# Patient Record
Sex: MALE | Race: WHITE | URBAN - METROPOLITAN AREA
[De-identification: names, ages, dates, MRNs, and addresses within clinical notes are randomized per-mention and may not be internally consistent; named-entity substitution may affect disease eponyms.]

---

## 2017-04-25 DIAGNOSIS — G35 MULTIPLE SCLEROSIS (H): ICD-10-CM

## 2017-12-07 ENCOUNTER — OFFICE VISIT (OUTPATIENT)
Dept: NEUROLOGY | Facility: CLINIC | Age: 62
End: 2017-12-07

## 2017-12-07 VITALS
DIASTOLIC BLOOD PRESSURE: 81 MMHG | WEIGHT: 257 LBS | HEIGHT: 72 IN | BODY MASS INDEX: 34.81 KG/M2 | HEART RATE: 65 BPM | SYSTOLIC BLOOD PRESSURE: 145 MMHG

## 2017-12-07 DIAGNOSIS — E55.9 VITAMIN D DEFICIENCY: Primary | ICD-10-CM

## 2017-12-07 DIAGNOSIS — E55.9 VITAMIN D DEFICIENCY: ICD-10-CM

## 2017-12-07 DIAGNOSIS — G35 MULTIPLE SCLEROSIS (H): ICD-10-CM

## 2017-12-07 LAB
ALBUMIN SERPL-MCNC: 3.8 G/DL (ref 3.4–5)
ALP SERPL-CCNC: 74 U/L (ref 40–150)
ALT SERPL W P-5'-P-CCNC: 36 U/L (ref 0–70)
ANION GAP SERPL CALCULATED.3IONS-SCNC: 6 MMOL/L (ref 3–14)
AST SERPL W P-5'-P-CCNC: 27 U/L (ref 0–45)
BASOPHILS # BLD AUTO: 0.1 10E9/L (ref 0–0.2)
BASOPHILS NFR BLD AUTO: 0.9 %
BILIRUB SERPL-MCNC: 0.5 MG/DL (ref 0.2–1.3)
BUN SERPL-MCNC: 20 MG/DL (ref 7–30)
CALCIUM SERPL-MCNC: 9.3 MG/DL (ref 8.5–10.1)
CHLORIDE SERPL-SCNC: 102 MMOL/L (ref 94–109)
CO2 SERPL-SCNC: 31 MMOL/L (ref 20–32)
CREAT SERPL-MCNC: 0.88 MG/DL (ref 0.66–1.25)
DEPRECATED CALCIDIOL+CALCIFEROL SERPL-MC: 59 UG/L (ref 20–75)
DIFFERENTIAL METHOD BLD: NORMAL
EOSINOPHIL # BLD AUTO: 0.1 10E9/L (ref 0–0.7)
EOSINOPHIL NFR BLD AUTO: 1.6 %
ERYTHROCYTE [DISTWIDTH] IN BLOOD BY AUTOMATED COUNT: 12.4 % (ref 10–15)
GFR SERPL CREATININE-BSD FRML MDRD: 88 ML/MIN/1.7M2
GLUCOSE SERPL-MCNC: 94 MG/DL (ref 70–99)
HCT VFR BLD AUTO: 42.9 % (ref 40–53)
HGB BLD-MCNC: 15.4 G/DL (ref 13.3–17.7)
IMM GRANULOCYTES # BLD: 0 10E9/L (ref 0–0.4)
IMM GRANULOCYTES NFR BLD: 0.3 %
LYMPHOCYTES # BLD AUTO: 2.4 10E9/L (ref 0.8–5.3)
LYMPHOCYTES NFR BLD AUTO: 36.6 %
MCH RBC QN AUTO: 32 PG (ref 26.5–33)
MCHC RBC AUTO-ENTMCNC: 35.9 G/DL (ref 31.5–36.5)
MCV RBC AUTO: 89 FL (ref 78–100)
MONOCYTES # BLD AUTO: 0.8 10E9/L (ref 0–1.3)
MONOCYTES NFR BLD AUTO: 11.6 %
NEUTROPHILS # BLD AUTO: 3.2 10E9/L (ref 1.6–8.3)
NEUTROPHILS NFR BLD AUTO: 49 %
NRBC # BLD AUTO: 0 10*3/UL
NRBC BLD AUTO-RTO: 0 /100
PLATELET # BLD AUTO: 213 10E9/L (ref 150–450)
POTASSIUM SERPL-SCNC: 3.7 MMOL/L (ref 3.4–5.3)
PROT SERPL-MCNC: 7.6 G/DL (ref 6.8–8.8)
RBC # BLD AUTO: 4.82 10E12/L (ref 4.4–5.9)
SODIUM SERPL-SCNC: 139 MMOL/L (ref 133–144)
WBC # BLD AUTO: 6.5 10E9/L (ref 4–11)

## 2017-12-07 RX ORDER — POLYETHYLENE GLYCOL 3350, SODIUM SULFATE ANHYDROUS, SODIUM BICARBONATE, SODIUM CHLORIDE, POTASSIUM CHLORIDE 236; 22.74; 6.74; 5.86; 2.97 G/4L; G/4L; G/4L; G/4L; G/4L
POWDER, FOR SOLUTION ORAL
COMMUNITY
Start: 2017-01-23

## 2017-12-07 RX ORDER — DIPHENOXYLATE HYDROCHLORIDE AND ATROPINE SULFATE 2.5; .025 MG/1; MG/1
TABLET ORAL
COMMUNITY
Start: 2008-04-03

## 2017-12-07 RX ORDER — HYDROCHLOROTHIAZIDE 50 MG/1
50 TABLET ORAL
COMMUNITY
Start: 2017-12-02

## 2017-12-07 RX ORDER — VALSARTAN 40 MG/1
40 TABLET ORAL
COMMUNITY
Start: 2017-01-23

## 2017-12-07 RX ORDER — AMLODIPINE BESYLATE 10 MG/1
10 TABLET ORAL
COMMUNITY
Start: 2017-12-02

## 2017-12-07 ASSESSMENT — PAIN SCALES - GENERAL: PAINLEVEL: NO PAIN (0)

## 2017-12-07 NOTE — MR AVS SNAPSHOT
After Visit Summary   12/7/2017    Best Mcintyre    MRN: [id number]           Patient Information     Date Of Birth          1955        Visit Information        Provider Department      12/7/2017 10:30 AM Dave Horn MD [facility]        Today's Diagnoses     Vitamin D deficiency    -  1    Multiple sclerosis (H)           Follow-ups after your visit        Follow-up notes from your care team     Return in about 1 year (around 12/7/2018).      Who to contact     Please call your clinic at [phone] to:    Ask questions about your health    Make or cancel appointments    Discuss your medicines    Learn about your test results    Speak to your doctor   If you have compliments or concerns about an experience at your clinic, or if you wish to file a complaint, please contact [facility] Patient Relations at [phone] or email us at [email or web address]         Additional Information About Your Visit        MyChart Information     [employer] gives you secure access to your electronic health record. If you see a primary care provider, you can also send messages to your care team and make appointments. If you have questions, please call your primary care clinic.  If you do not have a primary care provider, please call [phone] and they will assist you.      [employer] is an electronic gateway that provides easy, online access to your medical records. With [employer], you can request a clinic appointment, read your test results, renew a prescription or communicate with your care team.     To access your existing account, please contact your [facility] or call [phone] for assistance.        Care EveryWhere ID     This is your Care EveryWhere ID. This could be used by other organizations to access your [facility] medical records  [id number]        Your Vitals Were     Pulse Height BMI (Body Mass Index)              65 1.829 m (6') 34.86 kg/m2          Blood Pressure from Last 3 Encounters:   12/07/17 145/81   12/06/16 108/60   01/04/16 134/74    Weight from Last 3 Encounters:   12/07/17 116.6 kg (257 lb)               Primary Care Provider Office Phone # Fax #    Denton Andrews [phone]       [facility] [address] [address]        Equal Access to Services     CHATA PORTILLO : Hadii aad ku hadasho Soomaali, waaxda luqadaha, qaybta kaalmada adeegyada, waxay idiin hayaan adeeg kharash la'aan ah. So [facility] [phone].    ATENCIÓN: Si habla español, tiene a boswell disposición servicios gratuitos de asistencia lingüística. [facility] [phone].    We comply with applicable federal civil rights laws and [place] laws. We do not discriminate on the basis of race, color, national origin, age, disability, sex, sexual orientation, or gender identity.            Thank you!     Thank you for choosing [facility] NEUROLOGY  for your care. Our goal is always to provide you with excellent care. Hearing back from our patients is one way we can continue to improve our services. Please take a few minutes to complete the written survey that you may receive in the mail after your visit with us. Thank you!             Your Updated Medication List - Protect others around you: Learn how to safely use, store and throw away your medicines at www.disposemymeds.org.          This list is accurate as of: 12/7/17 11:59 PM.  Always use your most recent med list.                   Brand Name Dispense Instructions for use Diagnosis    amLODIPine 10 MG tablet    NORVASC     Take 10 mg by mouth    Vitamin D deficiency, Multiple sclerosis (H)       aspirin  MG EC tablet       Vitamin D deficiency, Multiple sclerosis (H)       FISH OIL PO       Vitamin D deficiency, Multiple sclerosis (H)       hydrochlorothiazide 50 MG tablet    HYDRODIURIL     Take 50 mg by mouth    Vitamin D deficiency, Multiple sclerosis (H)        interferon beta-1b 0.3 MG injection    BETASERON     Injection every other day    Vitamin D deficiency, Multiple sclerosis (H)       MULTI-VITAMINS Tabs       Vitamin D deficiency, Multiple sclerosis (H)       PEG-3350/Electrolytes 236 G Solr      As directed. Do not fill until patient contacts the pharmacy.    Vitamin D deficiency, Multiple sclerosis (H)       valsartan 40 MG tablet    DIOVAN     Take 40 mg by mouth    Vitamin D deficiency, Multiple sclerosis (H)       vitamin D3 1000 UNITS Caps      Take 1,000 Units by mouth    Vitamin D deficiency, Multiple sclerosis (H)

## 2017-12-07 NOTE — LETTER
2017       RE: Best Mcintyre  [address] [facility] [address]     Dear Colleague,    Thank you for referring your patient, Best Mcintyre, to the [facility] NEUROLOGY at [facility]. Please see a copy of my visit note below.    2017      Denton Andrews MD   [facility]   [address] [place] [address]      RE: Bets Mcintyre   MRN: [id number]   : 1955      Dear Ang:      This is in regard to followup on Best Mcintyre.  The patient returned today with a chief complaint of multiple sclerosis.      The patient has continued to do well.  He said that he has actually improved over the last 6-7 years since he retired as .  The patient did note he is able to exercise.  He does not notice any deficits at all.  He no longer has any weakness of his left leg and does not notice any spasticity of it.  He has been faithful about continuing the generic form of Betaseron, Extavia.  He said he is really getting sick of the injections and sometimes has some hard spots where he has had to place a needle.  The patient said that he would like to consider now going off the medication as he has been free of any exacerbations for a number of years.  He is aware that I would prefer having an MRI scan done of his brain, cervical and thoracic spinal cord with and without gadolinium before going off it a year later.  He said he really does not want to go through the testing and would just go ahead and stop his Betaseron when the prescription runs out.  He did tell me that he would have followup with us on a p.r.n. basis but also within the next year and have followup MRI scans then.  He assured me that he did go ahead with testing of his heart as I recommended after I heard a murmur on his last visit.  He had mild aortic stenosis on an echocardiogram.  I could not find the Holter monitor report, but he said he was told it was normal.  He  said his blood pressure has been under good control with Diovan, Norvasc and hydrochlorothiazide.  He has been careful about sun exposure.  He did have a basal cell cancer removed surgically from near his right tragus 10 years ago.  He did discuss with me how he keeps busy besides exercising.  He assured me he also does continue to take vitamin D3 6000 International units.  In the past, he has had some resistance to vitamin D therapy which some patients with multiple sclerosis have.  Since I last saw him, his father  at 87 from chronic obstructive lung disease.  He had had environmental-work exposures and also been a smoker and had asbestosis.  He was quite disabled.      The patient's brother has continued to decline with his multiple sclerosis and has to wear a brace and it is hard for him to walk.      Neurologic examination revealed a pleasant man.  Using the machine type of cuff today, his blood pressure was 145/81, pulse 65.  He assured me that yesterday it was normal when he took it.  Otherwise, his gait is quite normal now.  He almost has an imperceptible limp of the left leg.  He was able to do tandem and had negative Romberg.  He is able to walk 25 feet in 7 seconds.  Muscle stretch reflexes were present and symmetric now.  His left toe is still extensor.  Strength testing was normal.  There was just a hint of reduced rapid alternating motion rate of the left foot and very, very minimal spasticity.  Cranial nerve examination was totally unremarkable.  I could not auscultate cervical bruits.  He had a regular cardiac rhythm and did have a very soft systolic murmur heard best at the base.      IMPRESSION:  Stable multiple sclerosis.      The patient has stable multiple sclerosis and is doing quite well.  He is going to now go off of Extavia-Betaseron when his prescription ends.  He will have a neurologic followup at least one more time and also on a p.r.n. basis here at [facility] in 1 year.  He understands he  will need to undergo imaging then.  He is going to have appropriate blood work done today including complete blood count, chemistry profile and vitamin D level.  He understands he needs to continue on vitamin D indefinitely.      I spent 25 minutes with the patient today and over 50% of the time this involved counseling and coordination of care.      Thank you for allowing me to see this patient.      Sincerely yours,      MD JUAN Valdes MD             D: 2017 17:03   T: 2017 07:22   MT: al      Name:     DONALD MALONE   MRN:      [id number]        Account:      [id number]   :      1955           Service Date: 2017      Document: [id number]       Again, thank you for allowing me to participate in the care of your patient.      Sincerely,    Juan Horn MD

## 2017-12-07 NOTE — LETTER
2017      RE: Best Mcintyre  [address] [facility] [address]       Dear Colleague,    Thank you for the opportunity to participate in the care of your patient, Best Mcintyre, at the [facility] NEUROLOGY at [facility]. Please see a copy of my visit note below.    2017      Denton Andrews MD   [facility]   [address] [place] [address]      RE: Best Mcintyre   MRN: [id number]   : 1955      Dear Ang:      This is in regard to followup on Best Mcintyre.  The patient returned today with a chief complaint of multiple sclerosis.      The patient has continued to do well.  He said that he has actually improved over the last 6-7 years since he retired as .  The patient did note he is able to exercise.  He does not notice any deficits at all.  He no longer has any weakness of his left leg and does not notice any spasticity of it.  He has been faithful about continuing the generic form of Betaseron, Extavia.  He said he is really getting sick of the injections and sometimes has some hard spots where he has had to place a needle.  The patient said that he would like to consider now going off the medication as he has been free of any exacerbations for a number of years.  He is aware that I would prefer having an MRI scan done of his brain, cervical and thoracic spinal cord with and without gadolinium before going off it a year later.  He said he really does not want to go through the testing and would just go ahead and stop his Betaseron when the prescription runs out.  He did tell me that he would have followup with us on a p.r.n. basis but also within the next year and have followup MRI scans then.  He assured me that he did go ahead with testing of his heart as I recommended after I heard a murmur on his last visit.  He had mild aortic stenosis on an echocardiogram.  I could not find the Holter monitor report, but he  said he was told it was normal.  He said his blood pressure has been under good control with Diovan, Norvasc and hydrochlorothiazide.  He has been careful about sun exposure.  He did have a basal cell cancer removed surgically from near his right tragus 10 years ago.  He did discuss with me how he keeps busy besides exercising.  He assured me he also does continue to take vitamin D3 6000 International units.  In the past, he has had some resistance to vitamin D therapy which some patients with multiple sclerosis have.  Since I last saw him, his father  at 87 from chronic obstructive lung disease.  He had had environmental-work exposures and also been a smoker and had asbestosis.  He was quite disabled.      The patient's brother has continued to decline with his multiple sclerosis and has to wear a brace and it is hard for him to walk.      Neurologic examination revealed a pleasant man.  Using the machine type of cuff today, his blood pressure was 145/81, pulse 65.  He assured me that yesterday it was normal when he took it.  Otherwise, his gait is quite normal now.  He almost has an imperceptible limp of the left leg.  He was able to do tandem and had negative Romberg.  He is able to walk 25 feet in 7 seconds.  Muscle stretch reflexes were present and symmetric now.  His left toe is still extensor.  Strength testing was normal.  There was just a hint of reduced rapid alternating motion rate of the left foot and very, very minimal spasticity.  Cranial nerve examination was totally unremarkable.  I could not auscultate cervical bruits.  He had a regular cardiac rhythm and did have a very soft systolic murmur heard best at the base.      IMPRESSION:  Stable multiple sclerosis.      The patient has stable multiple sclerosis and is doing quite well.  He is going to now go off of Extavia-Betaseron when his prescription ends.  He will have a neurologic followup at least one more time and also on a p.r.n. basis here  at [facility] in 1 year.  He understands he will need to undergo imaging then.  He is going to have appropriate blood work done today including complete blood count, chemistry profile and vitamin D level.  He understands he needs to continue on vitamin D indefinitely.      I spent 25 minutes with the patient today and over 50% of the time this involved counseling and coordination of care.      Thank you for allowing me to see this patient.      Sincerely yours,      Dave Horn MD         D: 2017 17:03   T: 2017 07:22   MT: al      Name:     DONALD MALONE   MRN:      [id number]        Account:      [id number]   :      1955           Service Date: 2017      Document: [id number]

## 2017-12-08 ENCOUNTER — CARE COORDINATION (OUTPATIENT)
Dept: NEUROLOGY | Facility: CLINIC | Age: 62
End: 2017-12-08

## 2017-12-08 NOTE — PROGRESS NOTES
2017      Denton Andrews MD   [facility]   [address] [place] [address]      RE: Best Mcintyre   MRN: [id number]   : 1955      Dear Ang:      This is in regard to followup on Best Mcintyre.  The patient returned today with a chief complaint of multiple sclerosis.      The patient has continued to do well.  He said that he has actually improved over the last 6-7 years since he retired as .  The patient did note he is able to exercise.  He does not notice any deficits at all.  He no longer has any weakness of his left leg and does not notice any spasticity of it.  He has been faithful about continuing the generic form of Betaseron, Extavia.  He said he is really getting sick of the injections and sometimes has some hard spots where he has had to place a needle.  The patient said that he would like to consider now going off the medication as he has been free of any exacerbations for a number of years.  He is aware that I would prefer having an MRI scan done of his brain, cervical and thoracic spinal cord with and without gadolinium before going off it a year later.  He said he really does not want to go through the testing and would just go ahead and stop his Betaseron when the prescription runs out.  He did tell me that he would have followup with us on a p.r.n. basis but also within the next year and have followup MRI scans then.  He assured me that he did go ahead with testing of his heart as I recommended after I heard a murmur on his last visit.  He had mild aortic stenosis on an echocardiogram.  I could not find the Holter monitor report, but he said he was told it was normal.  He said his blood pressure has been under good control with Diovan, Norvasc and hydrochlorothiazide.  He has been careful about sun exposure.  He did have a basal cell cancer removed surgically from near his right tragus 10 years ago.  He did discuss with me how he keeps busy  besides exercising.  He assured me he also does continue to take vitamin D3 6000 International units.  In the past, he has had some resistance to vitamin D therapy which some patients with multiple sclerosis have.  Since I last saw him, his father  at 87 from chronic obstructive lung disease.  He had had environmental-work exposures and also been a smoker and had asbestosis.  He was quite disabled.      The patient's brother has continued to decline with his multiple sclerosis and has to wear a brace and it is hard for him to walk.      Neurologic examination revealed a pleasant man.  Using the machine type of cuff today, his blood pressure was 145/81, pulse 65.  He assured me that yesterday it was normal when he took it.  Otherwise, his gait is quite normal now.  He almost has an imperceptible limp of the left leg.  He was able to do tandem and had negative Romberg.  He is able to walk 25 feet in 7 seconds.  Muscle stretch reflexes were present and symmetric now.  His left toe is still extensor.  Strength testing was normal.  There was just a hint of reduced rapid alternating motion rate of the left foot and very, very minimal spasticity.  Cranial nerve examination was totally unremarkable.  I could not auscultate cervical bruits.  He had a regular cardiac rhythm and did have a very soft systolic murmur heard best at the base.      IMPRESSION:  Stable multiple sclerosis.      The patient has stable multiple sclerosis and is doing quite well.  He is going to now go off of Extavia-Betaseron when his prescription ends.  He will have a neurologic followup at least one more time and also on a p.r.n. basis here at [facility] in 1 year.  He understands he will need to undergo imaging then.  He is going to have appropriate blood work done today including complete blood count, chemistry profile and vitamin D level.  He understands he needs to continue on vitamin D indefinitely.      I spent 25 minutes with the patient today  and over 50% of the time this involved counseling and coordination of care.      Thank you for allowing me to see this patient.      Sincerely yours,      MD JUAN Valdes MD             D: 2017 17:03   T: 2017 07:22   MT: al      Name:     DONALD MALONE   MRN:      [id number]        Account:      [id number]   :      1955           Service Date: 2017      Document: [id number]

## 2017-12-08 NOTE — PROGRESS NOTES
Dave Horn MD McAllister, Angela, DARRIUS                   Vit d 59[perfect], all blood tests normal           12/8/17: called patient with above message from Dr. Horn. He verbalized understanding. He has no questions or concerns at this time.

## 2018-03-16 ENCOUNTER — TELEPHONE (OUTPATIENT)
Dept: NEUROLOGY | Facility: CLINIC | Age: 63
End: 2018-03-16

## 2018-03-16 DIAGNOSIS — G35 MULTIPLE SCLEROSIS (H): ICD-10-CM

## 2018-03-16 DIAGNOSIS — E55.9 VITAMIN D DEFICIENCY: ICD-10-CM

## 2018-03-16 NOTE — TELEPHONE ENCOUNTER
Prior Authorization Specialty Medication Request    Medication/Dose: Extavia   ICD code (if different than what is on RX): G35, Relapsing Remitting Multiple Sclerosis  Previously Tried and Failed:none    Important Lab Values:  Rationale: Patient has been stable on medication for almost 7 years.    Insurance Name: Emily  Insurance ID:   Insurance Phone Number:     Pharmacy Information (if different than what is on RX)  Name:  Phone:

## 2018-03-19 NOTE — TELEPHONE ENCOUNTER
PA Initiation    Medication: Extavia   Insurance Company: Emily - Phone [phone] Fax [phone]  Pharmacy Filling the Rx: [place], [place] - [address] [place] [address]  Filling Pharmacy Phone: [phone]  Filling Pharmacy Fax:    Start Date: 3/19/2018    Central Prior Authorization Team   Phone: [phone]

## 2018-03-20 NOTE — TELEPHONE ENCOUNTER
Prior Authorization Not Needed per Insurance    Medication: Extavia - Prior Auth Not Needed  Insurance Company: Emily - Phone [phone] Fax [phone]  Pharmacy Filling the Rx: [employer] [place], [place] - [facility]  Pharmacy Notified: No  Patient Notified: No    Per Insurance this medication is covered under patient's pharmacy benefit without prior approval

## 2019-06-11 ASSESSMENT — MIFFLIN-ST. JEOR
SCORE: 1961.99
SCORE: 2028

## 2019-06-14 ENCOUNTER — ANESTHESIA - HEALTHEAST (OUTPATIENT)
Dept: SURGERY | Facility: CLINIC | Age: 64
End: 2019-06-14

## 2019-06-14 ENCOUNTER — SURGERY - HEALTHEAST (OUTPATIENT)
Dept: SURGERY | Facility: CLINIC | Age: 64
End: 2019-06-14

## 2019-06-14 ASSESSMENT — MIFFLIN-ST. JEOR: SCORE: 2007.35

## 2020-03-02 ENCOUNTER — HEALTH MAINTENANCE LETTER (OUTPATIENT)
Age: 65
End: 2020-03-02

## 2020-12-20 ENCOUNTER — HEALTH MAINTENANCE LETTER (OUTPATIENT)
Age: 65
End: 2020-12-20

## 2021-04-18 ENCOUNTER — HEALTH MAINTENANCE LETTER (OUTPATIENT)
Age: 66
End: 2021-04-18

## 2021-05-29 NOTE — ANESTHESIA POSTPROCEDURE EVALUATION
Patient: Best Mcintyre  LOCAL FLAP VS OTHER RECONSTRUCTION OF RIGHT CHEEK DEFECT  Anesthesia type: MAC    Patient location: PACU  Last vitals:   Vitals Value Taken Time   /79 6/14/2019  6:40 PM   Temp 36.7  C (98  F) 6/14/2019  6:10 PM   Pulse 71 6/14/2019  6:40 PM   Resp 16 6/14/2019  6:40 PM   SpO2 96 % 6/14/2019  6:40 PM     Post vital signs: stable  Level of consciousness: awake and responds to simple questions  Post-anesthesia pain: pain controlled  Post-anesthesia nausea and vomiting: no  Pulmonary: unassisted, return to baseline  Cardiovascular: stable and blood pressure at baseline  Hydration: adequate  Anesthetic events: no    QCDR Measures:  ASA# 11 - Olivia-op Cardiac Arrest: ASA11B - Patient did NOT experience unanticipated cardiac arrest  ASA# 12 - Olivia-op Mortality Rate: ASA12B - Patient did NOT die  ASA# 13 - PACU Re-Intubation Rate: ASA13B - Patient did NOT require a new airway mgmt  ASA# 10 - Composite Anes Safety: ASA10A - No serious adverse event    Additional Notes:

## 2021-05-29 NOTE — ANESTHESIA PREPROCEDURE EVALUATION
Anesthesia Evaluation      Patient summary reviewed   No history of anesthetic complications     Airway   Mallampati: II  Neck ROM: full   Pulmonary - normal exam                          Cardiovascular - normal exam  (+) hypertension, ,      Neuro/Psych      Comments: MS    Endo/Other - negative ROS   (+) obesity,      GI/Hepatic/Renal            Dental - normal exam                        Anesthesia Plan  Planned anesthetic: MAC    ASA 3   Induction: intravenous   Anesthetic plan and risks discussed with: patient    Post-op plan: routine recovery

## 2021-05-29 NOTE — ANESTHESIA CARE TRANSFER NOTE
Last vitals:   Vitals:    06/14/19 1810   BP: 129/78   Pulse: 75   Resp: 16   Temp: 36.7  C (98  F)   SpO2: 96%     Patient's level of consciousness is awake  Spontaneous respirations: yes  Maintains airway independently: yes  Dentition unchanged: yes  Oropharynx: oropharynx clear of all foreign objects    QCDR Measures:  ASA# 20 - Surgical Safety Checklist: WHO surgical safety checklist completed prior to induction    PQRS# 430 - Adult PONV Prevention: 4558F - Pt received => 2 anti-emetic agents (different classes) preop & intraop  ASA# 8 - Peds PONV Prevention: NA - Not pediatric patient, not GA or 2 or more risk factors NOT present  PQRS# 424 - Olivia-op Temp Management: 4559F - At least one body temp DOCUMENTED => 35.5C or 95.9F within required timeframe  PQRS# 426 - PACU Transfer Protocol: - Transfer of care checklist used  ASA# 14 - Acute Post-op Pain: ASA14B - Patient did NOT experience pain >= 7 out of 10

## 2021-06-03 VITALS — BODY MASS INDEX: 35.21 KG/M2 | WEIGHT: 260 LBS | HEIGHT: 72 IN

## 2021-10-03 ENCOUNTER — HEALTH MAINTENANCE LETTER (OUTPATIENT)
Age: 66
End: 2021-10-03

## 2022-07-10 ENCOUNTER — HEALTH MAINTENANCE LETTER (OUTPATIENT)
Age: 67
End: 2022-07-10

## 2022-09-10 ENCOUNTER — HEALTH MAINTENANCE LETTER (OUTPATIENT)
Age: 67
End: 2022-09-10

## 2023-07-23 ENCOUNTER — HEALTH MAINTENANCE LETTER (OUTPATIENT)
Age: 68
End: 2023-07-23